# Patient Record
Sex: FEMALE | Race: OTHER | Employment: OTHER | URBAN - METROPOLITAN AREA
[De-identification: names, ages, dates, MRNs, and addresses within clinical notes are randomized per-mention and may not be internally consistent; named-entity substitution may affect disease eponyms.]

---

## 2024-07-18 ENCOUNTER — OFFICE VISIT (OUTPATIENT)
Age: 24
End: 2024-07-18
Payer: OTHER GOVERNMENT

## 2024-07-18 VITALS
HEART RATE: 63 BPM | BODY MASS INDEX: 33.13 KG/M2 | WEIGHT: 180 LBS | HEIGHT: 62 IN | OXYGEN SATURATION: 97 % | TEMPERATURE: 97.2 F | RESPIRATION RATE: 18 BRPM | SYSTOLIC BLOOD PRESSURE: 111 MMHG | DIASTOLIC BLOOD PRESSURE: 67 MMHG

## 2024-07-18 DIAGNOSIS — E66.3 OVERWEIGHT: ICD-10-CM

## 2024-07-18 DIAGNOSIS — R29.818 SUSPECTED SLEEP APNEA: Primary | ICD-10-CM

## 2024-07-18 DIAGNOSIS — G47.30 INSOMNIA W/ SLEEP APNEA: ICD-10-CM

## 2024-07-18 DIAGNOSIS — G47.00 INSOMNIA W/ SLEEP APNEA: ICD-10-CM

## 2024-07-18 DIAGNOSIS — G47.21 CIRCADIAN RHYTHM SLEEP DISORDER, DELAYED SLEEP PHASE TYPE: ICD-10-CM

## 2024-07-18 DIAGNOSIS — Z72.821 INADEQUATE SLEEP HYGIENE: ICD-10-CM

## 2024-07-18 DIAGNOSIS — G47.19 EXCESSIVE DAYTIME SLEEPINESS: ICD-10-CM

## 2024-07-18 DIAGNOSIS — R06.83 SNORING: ICD-10-CM

## 2024-07-18 DIAGNOSIS — R06.81 WITNESSED APNEIC SPELLS: ICD-10-CM

## 2024-07-18 PROBLEM — G47.33 OBSTRUCTIVE SLEEP APNEA SYNDROME: Status: ACTIVE | Noted: 2024-05-16

## 2024-07-18 PROBLEM — M54.50 LOW BACK PAIN: Status: ACTIVE | Noted: 2024-07-18

## 2024-07-18 PROBLEM — R41.89 IMPAIRED COGNITION: Status: ACTIVE | Noted: 2024-05-16

## 2024-07-18 PROCEDURE — 99204 OFFICE O/P NEW MOD 45 MIN: CPT | Performed by: OTOLARYNGOLOGY

## 2024-07-18 RX ORDER — SUMATRIPTAN 25 MG/1
TABLET, FILM COATED ORAL
COMMUNITY
Start: 2024-05-21 | End: 2025-05-09

## 2024-07-18 RX ORDER — AMITRIPTYLINE HYDROCHLORIDE 10 MG/1
10 TABLET, FILM COATED ORAL
COMMUNITY
Start: 2024-02-27

## 2024-07-18 ASSESSMENT — SLEEP AND FATIGUE QUESTIONNAIRES
HOW LIKELY ARE YOU TO NOD OFF OR FALL ASLEEP WHEN YOU ARE A PASSENGER IN A CAR FOR AN HOUR WITHOUT A BREAK: MODERATE CHANCE OF DOZING
ESS TOTAL SCORE: 13
HOW LIKELY ARE YOU TO NOD OFF OR FALL ASLEEP WHILE SITTING INACTIVE IN A PUBLIC PLACE: MODERATE CHANCE OF DOZING
HOW LIKELY ARE YOU TO NOD OFF OR FALL ASLEEP WHILE WATCHING TV: HIGH CHANCE OF DOZING
HOW LIKELY ARE YOU TO NOD OFF OR FALL ASLEEP WHILE SITTING AND READING: HIGH CHANCE OF DOZING
HOW LIKELY ARE YOU TO NOD OFF OR FALL ASLEEP WHILE SITTING AND TALKING TO SOMEONE: WOULD NEVER DOZE
HOW LIKELY ARE YOU TO NOD OFF OR FALL ASLEEP WHILE SITTING QUIETLY AFTER LUNCH WITHOUT ALCOHOL: WOULD NEVER DOZE
HOW LIKELY ARE YOU TO NOD OFF OR FALL ASLEEP IN A CAR, WHILE STOPPED FOR A FEW MINUTES IN TRAFFIC: WOULD NEVER DOZE
HOW LIKELY ARE YOU TO NOD OFF OR FALL ASLEEP WHILE LYING DOWN TO REST IN THE AFTERNOON WHEN CIRCUMSTANCES PERMIT: HIGH CHANCE OF DOZING

## 2024-07-18 NOTE — PROGRESS NOTES
Isabela Aparicio presents today for   Chief Complaint   Patient presents with    New Patient     Referred by Dr.Micheal Sierra for sleep eval        Is someone accompanying this pt? No    Is the patient using any DME equipment during OV? No    -DME Company NA    Depression Screening:         No data to display                Learning Needs Questionnaire:     No question data found.      Fall Risk:          No data to display                 Abuse Screening:          No data to display                  Coordination of Care:    1. Have you been to the ER, urgent care clinic since your last visit? Hospitalized since your last visit? No    2. Have you seen or consulted any other health care providers outside of the [facility] since your last visit? Include any pap smears or colon screening. PCP    Medication list has been update per patient.        
exactly as directed.       Allergies as of 07/18/2024    (Not on File)       Patient Active Problem List   Diagnosis    Obstructive sleep apnea syndrome    Low back pain    Impaired cognition       History reviewed. No pertinent past medical history.    History reviewed. No pertinent surgical history.    Family History   Problem Relation Age of Onset    Hypertension Father        Objective:     Vitals:    Weight BMI   Wt Readings from Last 3 Encounters:   07/18/24 81.6 kg (180 lb)    Body mass index is 32.92 kg/m².     BP HR SaO2   BP Readings from Last 3 Encounters:   07/18/24 111/67    Pulse Readings from Last 3 Encounters:   07/18/24 63    SpO2 Readings from Last 3 Encounters:   07/18/24 97%          Additional Measurements    07/18/24 1514   Neck (Inches): 14        Physical Exam  Constitutional:       Appearance: Normal appearance.   HENT:      Head: Normocephalic and atraumatic.   Eyes:      Extraocular Movements: Extraocular movements intact.      Conjunctiva/sclera: Conjunctivae normal.      Pupils: Pupils are equal, round, and reactive to light.   Cardiovascular:      Rate and Rhythm: Normal rate.      Heart sounds: No murmur heard.     No gallop.   Pulmonary:      Effort: Pulmonary effort is normal.      Breath sounds: No wheezing or rales.   Neurological:      General: No focal deficit present.      Mental Status: She is alert and oriented to person, place, and time.   Psychiatric:         Mood and Affect: Mood normal.         Behavior: Behavior normal.       The mandibular molar Class :   [x]1 []2 []3        Mallampati I Airway Classification:   []1 []2 []3 [x]4      Data reviewed:  Chemistry TSH 1.1000 0.3500 - 4.9400 02/29/2024 N     Historical Sleep Testing Data: None    Total time spent on this encounter, including previsit review of of separately obtained history, face to face interaction, performing medically appropriate physical exam, patient/counseling, ordering tests, care coordination and

## 2024-07-18 NOTE — PATIENT INSTRUCTIONS
Please make a follow up appointment to discuss the results of your sleep study. If this is impossible for some reason, please send me a \"My Chart\" message so that I may get back with you in a timely manner.    The [facility] is located in the [facility], adjacent to [facility]. The lab is on the second floor. The direct number to call for sleep study related questions is: [phone].    Please call our clinic back at [phone] or send a message on [employer] if you have any questions or concerns or if you are experiencing any of the following:     You have not received a follow up appointment within 30 days prior the recommended follow up time.    If you are not tolerating treatment plan and/or not able to obtain equipment or prescribed medication(s).  if you are experiencing any difficulties with the Durable Medical Equipment  (DME) Company you may be using or is assigned to you.  Two weeks have passed and you have not received an appointment for a scheduled procedure.  Two weeks have passed since you underwent a test and/or procedure and you have not received your results.     If you are using a CPAP/BIPAP, or Home Ventilator Device- Please note the following.  Currently, many DMEs are experiencing supply chain difficulties and orders for equipment may be back logged several weeks.     Your  Durable Medical Equipment (DME ) company is supposed to provide you with replacement filters, tubing and masks. You can either call your DME when you need new supplies or you can arrange for an automatic shipment schedule.    Your need to be seen by our office at lat minimum of every 12 months in order to renew the prescription for these supplies.   Please make note of who your DME company is and their phone number.   Please make sure that you clean your mask and hosing on a regular basis.  Your DME can provide you with additional information regarding proper care and cleaning of your

## 2024-08-07 ENCOUNTER — HOSPITAL ENCOUNTER (OUTPATIENT)
Dept: SLEEP MEDICINE | Facility: HOSPITAL | Age: 24
Discharge: HOME | End: 2024-08-10
Attending: OTOLARYNGOLOGY
Payer: OTHER GOVERNMENT

## 2024-08-07 DIAGNOSIS — R29.818 SUSPECTED SLEEP APNEA: ICD-10-CM

## 2024-08-07 PROCEDURE — 95800 SLP STDY UNATTENDED: CPT

## 2024-08-19 ENCOUNTER — OFFICE VISIT (OUTPATIENT)
Age: 24
End: 2024-08-19
Payer: OTHER GOVERNMENT

## 2024-08-19 VITALS
DIASTOLIC BLOOD PRESSURE: 67 MMHG | OXYGEN SATURATION: 95 % | BODY MASS INDEX: 33.79 KG/M2 | WEIGHT: 179 LBS | HEART RATE: 83 BPM | SYSTOLIC BLOOD PRESSURE: 114 MMHG | RESPIRATION RATE: 18 BRPM | HEIGHT: 61 IN | TEMPERATURE: 98 F

## 2024-08-19 DIAGNOSIS — R06.83 SNORING: ICD-10-CM

## 2024-08-19 DIAGNOSIS — Z72.821 INADEQUATE SLEEP HYGIENE: ICD-10-CM

## 2024-08-19 DIAGNOSIS — R06.81 WITNESSED APNEIC SPELLS: ICD-10-CM

## 2024-08-19 DIAGNOSIS — G47.21 CIRCADIAN RHYTHM SLEEP DISORDER, DELAYED SLEEP PHASE TYPE: ICD-10-CM

## 2024-08-19 DIAGNOSIS — E66.3 OVERWEIGHT: ICD-10-CM

## 2024-08-19 DIAGNOSIS — G47.30 INSOMNIA W/ SLEEP APNEA: ICD-10-CM

## 2024-08-19 DIAGNOSIS — G47.33 MODERATE OBSTRUCTIVE SLEEP APNEA: Primary | ICD-10-CM

## 2024-08-19 DIAGNOSIS — G47.19 EXCESSIVE DAYTIME SLEEPINESS: ICD-10-CM

## 2024-08-19 DIAGNOSIS — G47.00 INSOMNIA W/ SLEEP APNEA: ICD-10-CM

## 2024-08-19 PROCEDURE — 99214 OFFICE O/P EST MOD 30 MIN: CPT | Performed by: OTOLARYNGOLOGY

## 2024-08-19 ASSESSMENT — SLEEP AND FATIGUE QUESTIONNAIRES
HOW LIKELY ARE YOU TO NOD OFF OR FALL ASLEEP WHILE SITTING QUIETLY AFTER LUNCH WITHOUT ALCOHOL: MODERATE CHANCE OF DOZING
HOW LIKELY ARE YOU TO NOD OFF OR FALL ASLEEP WHILE WATCHING TV: WOULD NEVER DOZE
HOW LIKELY ARE YOU TO NOD OFF OR FALL ASLEEP WHILE SITTING INACTIVE IN A PUBLIC PLACE: WOULD NEVER DOZE
HOW LIKELY ARE YOU TO NOD OFF OR FALL ASLEEP WHILE SITTING AND TALKING TO SOMEONE: WOULD NEVER DOZE
ESS TOTAL SCORE: 8
HOW LIKELY ARE YOU TO NOD OFF OR FALL ASLEEP WHILE LYING DOWN TO REST IN THE AFTERNOON WHEN CIRCUMSTANCES PERMIT: SLIGHT CHANCE OF DOZING
HOW LIKELY ARE YOU TO NOD OFF OR FALL ASLEEP WHEN YOU ARE A PASSENGER IN A CAR FOR AN HOUR WITHOUT A BREAK: MODERATE CHANCE OF DOZING
HOW LIKELY ARE YOU TO NOD OFF OR FALL ASLEEP IN A CAR, WHILE STOPPED FOR A FEW MINUTES IN TRAFFIC: WOULD NEVER DOZE
HOW LIKELY ARE YOU TO NOD OFF OR FALL ASLEEP WHILE SITTING AND READING: HIGH CHANCE OF DOZING

## 2024-08-19 ASSESSMENT — PATIENT HEALTH QUESTIONNAIRE - PHQ9
SUM OF ALL RESPONSES TO PHQ QUESTIONS 1-9: 0
2. FEELING DOWN, DEPRESSED OR HOPELESS: NOT AT ALL
SUM OF ALL RESPONSES TO PHQ QUESTIONS 1-9: 0
1. LITTLE INTEREST OR PLEASURE IN DOING THINGS: NOT AT ALL
SUM OF ALL RESPONSES TO PHQ QUESTIONS 1-9: 0
SUM OF ALL RESPONSES TO PHQ QUESTIONS 1-9: 0
SUM OF ALL RESPONSES TO PHQ9 QUESTIONS 1 & 2: 0

## 2024-08-19 NOTE — PATIENT INSTRUCTIONS
Please make a follow up appointment to discuss the results of your sleep study. If this is impossible for some reason, please send me a \"My Chart\" message so that I may get back with you in a timely manner.    The [facility] is located in the [facility], adjacent to [facility]. The lab is on the second floor. The direct number to call for sleep study related questions is: [phone].    Please call our clinic back at [phone] or send a message on [employer] if you have any questions or concerns or if you are experiencing any of the following:     You have not received a follow up appointment within 30 days prior the recommended follow up time.    If you are not tolerating treatment plan and/or not able to obtain equipment or prescribed medication(s).  if you are experiencing any difficulties with the Durable Medical Equipment  (DME) Company you may be using or is assigned to you.  Two weeks have passed and you have not received an appointment for a scheduled procedure.  Two weeks have passed since you underwent a test and/or procedure and you have not received your results.     If you are using a CPAP/BIPAP, or Home Ventilator Device- Please note the following.  Currently, many DMEs are experiencing supply chain difficulties and orders for equipment may be back logged several weeks.     Your  Durable Medical Equipment (DME ) company is supposed to provide you with replacement filters, tubing and masks. You can either call your DME when you need new supplies or you can arrange for an automatic shipment schedule.    Your need to be seen by our office at lat minimum of every 12 months in order to renew the prescription for these supplies.   Please make note of who your DME company is and their phone number.   Please make sure that you clean your mask and hosing on a regular basis.  Your DME can provide you with additional information regarding proper care and cleaning of your

## 2024-08-19 NOTE — ASSESSMENT & PLAN NOTE
RDI 15.9 .  Discussed options today and I think a trial of CPAP/APAP would be a good place to start.  The patient agrees and I will place an order and see her back 6 to 8 weeks after she gets her new CPAP machine.    She is going on terminal leave in November, 2024 I believe.  We still have plenty of time to start her on therapy and her main complaint is disrupted sleep, daytime sleepiness and trouble falling asleep and staying asleep.

## 2024-08-19 NOTE — PROGRESS NOTES
Isabela Aparicio presents today for   Chief Complaint   Patient presents with    Follow-up    Results       Is someone accompanying this pt? no    Is the patient using any DME equipment during OV? no    -DME Company: N/A    Depression Screenin/19/2024     2:12 PM   PHQ-9    Little interest or pleasure in doing things 0   Feeling down, depressed, or hopeless 0   PHQ-2 Score 0   PHQ-9 Total Score 0        [place] Sleepiness Scale:      2024     2:17 PM   Sleep Medicine   Sitting and reading 3   Watching TV 0   Sitting, inactive in a public place (e.g. a theatre or a meeting) 0   As a passenger in a car for an hour without a break 2   Lying down to rest in the afternoon when circumstances permit 1   Sitting and talking to someone 0   Sitting quietly after a lunch without alcohol 2   In a car, while stopped for a few minutes in traffic 0   [place] Sleepiness Score 8           Coordination of Care:  1. Have you been to the ER, urgent care clinic since your last visit? Hospitalized since your last visit?  no    2. Have you seen or consulted any other health care providers outside of the [facility] since your last visit? Include any pap smears or colon screening. no    Medication list has been updated according to patient.  
if the situation occurs.  If questions arise please do not hesitate to contact me or call our department.    Electronically signed by Anna Cooper DO on8/19/2024 at 3:47 PM

## 2024-08-21 ENCOUNTER — CLINICAL DOCUMENTATION (OUTPATIENT)
Age: 24
End: 2024-08-21